# Patient Record
(demographics unavailable — no encounter records)

---

## 2025-06-23 NOTE — PHYSICAL EXAM
[de-identified] : General appearance: well-nourished and hydrated, pleasant, alert and oriented x 3, cooperative. HEENT: Normocephalic, EOM intact, Nasal septum midline, Oral cavity clear, External auditory canal clear. Cardiovascular: no apparent abnormalities, lower leg edema, no varicosities, pedal pulses are palpable. Lymphatics Lymph nodes: none palpated, Lymphedema: not present. Neurologic: sensation is normal, no muscle weakness in upper or lower extremities, patella tendon reflexes intact. Dermatologic no apparent skin lesions, moist, warm, no rash. Spine: cervical spine appears normal and moves freely, thoracic spine appears normal and moves freely, lumbosacral spine appears normal and moves freely. Gait: nonantalgic.   Left knee Inspection: mild effusion. Wounds: none. Alignment: normal. Palpation: no specific tenderness on palpation. ROM active (in degrees): 10 degree flexion contracture. 10-90 with pain and crepitus through arc of motion Ligamentous laxity: all ligaments appear stable,, negative ant. drawer test, negative post. drawer test, stable to varus stress test, stable to valgus stress test. negative Lachman's test, negative pivot shift test Meniscal Test: negative McMurrays, negative Luke. Patellofemoral Alignment Test: Q angle-, normal. Muscle Test: good quad strength. Leg examination: calf is soft and non-tender.   Right Knee Inspection: no effusion Wounds: healed midline incision Alignment: normal. Palpation: no specific tenderness on palpation. ROM: Active (in degrees): 0-130 with no instability Ligamentous laxity (neg): negative ant. drawer test, negative post. drawer test, stable to varus stress test, stable to valgus stress test, Patellofemoral Alignment Test: Q angle-, normal. Muscle Test: good quad strength. Leg examination: calf is soft and non-tender.   Left hip Inspection: No swelling or ecchymosis. Wounds: none. Palpation: non-tender. Stability: no instability. Strength: 5/5 all motor groups. ROM: good ROM but mild limitation to rotation. Leg length: equal.   Right hip Inspection: No swelling or ecchymosis. Wounds: surgical incision. Palpation: non-tender. Stability: no instability. Strength: 5/5 all motor groups. ROM: no pain with FROM. Leg length: equal.   Left ankle Inspection: no erythema noted, mild swelling noted. Palpation: no pain on palpation. ROM: FROM without crepitus. Muscle strength: 5/5. Stability: no instability noted.   Right ankle Inspection: no erythema noted, mild swelling noted. ROM: FROM without crepitus. Palpation: no pain on palpation. Muscle strength: 5/5. Stability: no instability noted.   Left foot Inspection: color, texture and turgor are normal. pes planus ROM: full range of motion of all joints without pain or crepitus. Palpation: no tenderness. Stability: no instability noted.   Right foot Inspection: color, texture and turgor are normal. pes planus ROM: full range of motion of all joints without pain or crepitus. Palpation: no tenderness. Stability: no instability noted.   Left shoulder Inspection: no muscle asymmetry, no atrophy. Palpation: no tenderness noted, ACJ non-tender. ROM: full active ROM, full passive ROM. Strength testing): anterior deltoid, supraspinatus, infraspinatus, subscapularis all 5/5. Stability test: ant. apprehension negative, post. apprehension negative, relocation test negative. Impingement Test: negative NEER.   Right shoulder Inspection: no muscle asymmetry, no atrophy. Palpation: no tenderness noted, ACJ non-tender. ROM: full active ROM, full passive ROM. Strength testing): anterior deltoid, supraspinatus, infraspinatus, subscapularis all 5/5. Stability test: ant. apprehension negative, post. apprehension negative, relocation test negative. Impingement Test: negative NEER. Surgical Wounds: none.   Left elbow Inspection: negative swelling. Wounds: none. Palpation: non-tender. ROM: full ROM. Strength: 5/5 all groups. Stability: no instability. Mass: none.   Right elbow Inspection: negative swelling. Wounds: none. Palpation: non-tender. ROM: full ROM. Strength: 5/5 all groups. Stability: no instability. Mass: none.   Left wrist Inspection: negative swelling. Wound: none. Palpation (bone): no tenderness. ROM: full ROM. Strength: full , good.   Right wrist Inspection: negative swelling. Wound: none. Palpation (bone): no tenderness. ROM: full ROM. Strength: full , good.   Left hand Inspection: no skin changes, normal appearance. Wounds: none. Strength: full , able to make full fist. Sensation: light touch intact all fingers and thumb. Vascular: good capillary refill < 3 seconds, all fingers and thumb. Mass: none.   Right hand Inspection: no skin changes, normal appearance. Wounds: none. Strength: full , able to make full fist. Sensation: light touch intact all fingers and thumb. Vascular: good capillary refill < 3 seconds, all fingers and thumb. Mass: none.        [de-identified] : Left knee x-rays, standing AP/Lateral and Merchant films, and 45-degree PA standing view, taken at the office today shows diffuse tricompartmental degenerative arthritis, varus deformity, patella at appropriate height and center position, medial bone on bone sclerosis, patellofemoral joint space narrowing, multiple loose bodies, Kellgren and Andrez grade 4   Right knee x-ray merchant view taken at the office today demonstrates a total knee replacement with the patella in a central position  AP Pelvis taken 5/14/24 demonstrates a right THR and severe degenerative arthritis of left hip.

## 2025-06-23 NOTE — ADDENDUM
[FreeTextEntry1] : This note was written by Scottie Hinds on 06/23/2025 acting as scribe for Dr. Derrell Juarez M.D.   I, Dr. Derrell Juarez, have read and attest that all the information, medical decision making and discharge instructions within are true and accurate.

## 2025-06-23 NOTE — DISCUSSION/SUMMARY
[de-identified] : Discussed at length the natural history of left knee degenerative arthritis with limited ROM and reviewed non-operative and operative treatment. Prior non-operative treatment for more than 3 months by either myself or prior treating physicians, including NSAIDs, injection therapy, a structured exercise program or physiotherapy and weight management has not resolved the condition. Due to the acute pain related to bone-on-bone arthritis demonstrated on radiograph as noted above and associated functional disability that impacts all appropriate activities of daily living, I recommend a LEFT total knee replacement. A thorough discussion regarding the risks, benefits, convalescence and alternatives were reviewed. The risks can include but are not limited to anesthesia risk, bleeding wit possible transfusion, nerve injury, infection, revision surgery due to implant failure, bone fracture, deep vein thrombosis, cardiac failure, pneumonia, and respiratory distress. The patient's expectations were reviewed and compared to the surgeon's expectations. This allowed for a discussion regarding reasonable expectations for functional improvement, pain relief, and return to normal activities of daily living. Numerous questions were asked and answered to their satisfaction. The patient was involved in the decision-making process - we discussed implant choice and fixation along with all details of TKA. Models were used as an educational tool. Surgery will be scheduled at a convenient time. I did explain that they are stiff with limited range of motion preoperatively, which will dictate their postoperative range of motion. I also explained to her that she may have residual pain due to her left hip arthritis which may ultimately need a THR.   We also discussed the patient's current ongoing medical condition that were noted above, which will have an impact on their perioperative care. These conditions will need to be optimized. Therefore, they will need preoperative medical and cardiac clearance  Patient is doing well and is very happy following their s/p Right TKR. I reviewed x-rays with them and compared to prior films. I have reassured them that their implants are functioning well. All questions answered, understanding verbalized. KOOS Jr and PROMIS scores were obtained and reviewed.        no

## 2025-06-23 NOTE — HISTORY OF PRESENT ILLNESS
[de-identified] : ERIKA PONCE  70 year old female presents for initial evaluation of left knee OA and also s/p right TKR back in 2017. The pt reports that for the past 6 or so month she has been experiencing pain and buckling Sx in the left knee. Denies any injury. States that she has significant pain that is diffuse mainly when walking or sitting for a long time or taking stairs. It is sharp at times and disabling. She has no real swelling but does get significant buckling. Denies locking or clicking. She does get stiffness especially with the inability to fully extend the knee. The pt does occasionally use a cane for ambulation. She has taken Tylenol for the pain which does not really do anything, so she stopped taking it. She also takes Ibuprofen which has not helped much. PSHx left knee arthroscopic meniscectomy in 2011 and right THR with Dr. Kwan in 2019. PMHx HTN. NKDA. Regarding her contralateral knee replacement, she states that the knee is doing well and has no stiffness in that knee. States that occasionally when standing upright for prolonged periods of time she gets some discomfort in the knee but otherwise has no pain or functional limitation with relation to the right knee.